# Patient Record
Sex: MALE | Race: WHITE | NOT HISPANIC OR LATINO | Employment: FULL TIME | URBAN - METROPOLITAN AREA
[De-identification: names, ages, dates, MRNs, and addresses within clinical notes are randomized per-mention and may not be internally consistent; named-entity substitution may affect disease eponyms.]

---

## 2019-12-18 ENCOUNTER — HOSPITAL ENCOUNTER (EMERGENCY)
Facility: HOSPITAL | Age: 19
Discharge: HOME | End: 2019-12-18
Attending: EMERGENCY MEDICINE | Admitting: EMERGENCY MEDICINE
Payer: COMMERCIAL

## 2019-12-18 VITALS
HEART RATE: 76 BPM | OXYGEN SATURATION: 96 % | TEMPERATURE: 98.6 F | SYSTOLIC BLOOD PRESSURE: 127 MMHG | HEIGHT: 64 IN | DIASTOLIC BLOOD PRESSURE: 65 MMHG | WEIGHT: 102 LBS | RESPIRATION RATE: 18 BRPM | BODY MASS INDEX: 17.42 KG/M2

## 2019-12-18 DIAGNOSIS — R21 RASH AND NONSPECIFIC SKIN ERUPTION: Primary | ICD-10-CM

## 2019-12-18 LAB — S PYO DNA THROAT QL NAA+PROBE: NORMAL

## 2019-12-18 PROCEDURE — 87651 STREP A DNA AMP PROBE: CPT | Performed by: PHYSICIAN ASSISTANT

## 2019-12-18 PROCEDURE — 99283 EMERGENCY DEPT VISIT LOW MDM: CPT

## 2019-12-18 PROCEDURE — 99284 EMERGENCY DEPT VISIT MOD MDM: CPT | Performed by: PHYSICIAN ASSISTANT

## 2019-12-18 RX ORDER — PREDNISONE 20 MG/1
TABLET ORAL
Qty: 18 TABLET | Refills: 0 | Status: SHIPPED | OUTPATIENT
Start: 2019-12-18 | End: 2019-12-27

## 2019-12-18 NOTE — ED PROVIDER NOTES
History  Chief Complaint   Patient presents with    Rash     Pt  stated that about 6 months ago a rash that started on his left shoulder blade  Since then it has spread to around his whole thorax- lower back and abdomen and neck  He states that today and last night it has become worse  Patient is a 19 y/o M that with h/o asthma that presents to the ED with rash to trunk and upper extremities that started 6 months ago, but is worsening  Patient states the rash is not itchy or painful  He denies new foods, detergents, medications, soaps  No sick contacts  He did have a sore throat a couple weeks ago, but that resolved  No recent fevers, chills  History provided by:  Patient  Rash   Location:  Torso and shoulder/arm  Shoulder/arm rash location:  L arm and R arm  Torso rash location:  Upper back, lower back, abd LUQ, abd LLQ, abd RLQ, abd RUQ, L chest and R chest  Quality: dryness, redness and scaling    Quality: not blistering, not draining, not itchy, not painful and not peeling    Severity:  Moderate  Onset quality:  Gradual  Duration:  6 months  Timing:  Constant  Progression:  Worsening  Chronicity:  Chronic  Context: not animal contact, not chemical exposure, not diapers, not exposure to similar rash, not food, not medications, not new detergent/soap, not nuts, not plant contact, not sick contacts and not sun exposure    Relieved by:  Nothing  Worsened by:  Nothing  Ineffective treatments:  Antibiotic cream and moisturizers  Associated symptoms: sore throat    Associated symptoms: no diarrhea, no fever, no nausea, no throat swelling, no tongue swelling and not vomiting        None       Past Medical History:   Diagnosis Date    ADHD     Asthma        History reviewed  No pertinent surgical history  History reviewed  No pertinent family history  I have reviewed and agree with the history as documented      Social History     Tobacco Use    Smoking status: Never Smoker    Smokeless tobacco: Never Used   Substance Use Topics    Alcohol use: Not on file    Drug use: Yes     Types: Marijuana        Review of Systems   Constitutional: Negative for chills and fever  HENT: Positive for sore throat  Gastrointestinal: Negative for diarrhea, nausea and vomiting  Skin: Positive for rash  Neurological: Negative for dizziness, weakness and numbness  All other systems reviewed and are negative  Physical Exam  Physical Exam   Constitutional: He is oriented to person, place, and time  He appears well-developed and well-nourished  He is cooperative  No distress  HENT:   Head: Normocephalic and atraumatic  Right Ear: Hearing and tympanic membrane normal    Left Ear: Hearing and tympanic membrane normal    Nose: Nose normal    Mouth/Throat: Uvula is midline, oropharynx is clear and moist and mucous membranes are normal    Eyes: Pupils are equal, round, and reactive to light  Conjunctivae and EOM are normal    Neck: Normal range of motion  Neck supple  Cardiovascular: Normal rate, regular rhythm and normal heart sounds  No murmur heard  Pulmonary/Chest: Effort normal and breath sounds normal  He has no wheezes  He has no rhonchi  He has no rales  Abdominal: Soft  Normal appearance and bowel sounds are normal  There is no tenderness  Musculoskeletal: Normal range of motion  He exhibits no edema  Neurological: He is alert and oriented to person, place, and time  He has normal strength  No sensory deficit  Gait normal    Skin: Skin is warm and dry  Rash (salmon colored plaques to torso and upper extremities with silvery scale  ) noted  He is not diaphoretic  No pallor  Nursing note and vitals reviewed        Vital Signs  ED Triage Vitals [12/18/19 1239]   Temperature Pulse Respirations Blood Pressure SpO2   98 6 °F (37 °C) 72 18 127/65 99 %      Temp Source Heart Rate Source Patient Position - Orthostatic VS BP Location FiO2 (%)   Tympanic Monitor Lying Left arm --      Pain Score       No Pain           Vitals:    12/18/19 1239 12/18/19 1245   BP: 127/65 127/65   Pulse: 72 76   Patient Position - Orthostatic VS: Lying          Visual Acuity      ED Medications  Medications - No data to display    Diagnostic Studies  Results Reviewed     Procedure Component Value Units Date/Time    Strep A PCR [[id number]]  (Normal) Collected:  12/18/19 1252    Lab Status:  Final result Specimen:  Throat Updated:  12/18/19 1355     STREP A PCR None Detected                 No orders to display              Procedures  Procedures         ED Course                               MDM  Number of Diagnoses or Management Options  Rash and nonspecific skin eruption: established and worsening  Diagnosis management comments: Patient with salmon sandpaper rash, will order rapid strep to r/o strep pharyngitis  Differential includes guttate psoriasis, pityriasis rosea without a herald patch  Will give course of prednisone and advised f/u with derm if no improvement  Amount and/or Complexity of Data Reviewed  Clinical lab tests: ordered and reviewed    Patient Progress  Patient progress: stable        Disposition  Final diagnoses:   Rash and nonspecific skin eruption     Time reflects when diagnosis was documented in both MDM as applicable and the Disposition within this note     Time User Action Codes Description Comment    12/18/2019  2:02 PM Mariajose Nunes Add [R21] Rash and nonspecific skin eruption       ED Disposition     ED Disposition Condition Date/Time Comment    Discharge Stable Wed Dec 18, 2019  2:02 PM Ramos Thomson discharge to home/self care              Follow-up Information     Follow up With Specialties Details Why Contact Info Additional [address] D[facility]at[facility] Schedule an appointment as soon as possible for a visit  For recheck [address] [address] [place], [address]   [phone]          Patient's Medications   Discharge Prescriptions    PREDNISONE 20 MG TABLET    Take 3 tablets (60 mg total) by mouth daily for 3 days, THEN 2 tablets (40 mg total) daily for 3 days, THEN 1 tablet (20 mg total) daily for 3 days  Start Date: 12/18/2019End Date: 12/27/2019       Order Dose: --       Quantity: 18 tablet    Refills: 0     No discharge procedures on file      ED Provider  Electronically Signed by           Juan José Ma PA-C  12/18/19 0319

## 2019-12-18 NOTE — DISCHARGE INSTRUCTIONS
Take prednisone as directed  Use a moisturizer like lubriderm or gold bond, no other ointments  Follow up with dermatologist if rash continues

## 2020-09-02 ENCOUNTER — HOSPITAL ENCOUNTER (EMERGENCY)
Facility: HOSPITAL | Age: 20
Discharge: HOME | End: 2020-09-02
Attending: EMERGENCY MEDICINE | Admitting: EMERGENCY MEDICINE
Payer: COMMERCIAL

## 2020-09-02 VITALS
TEMPERATURE: 98.8 F | HEART RATE: 76 BPM | RESPIRATION RATE: 18 BRPM | DIASTOLIC BLOOD PRESSURE: 77 MMHG | WEIGHT: 96 LBS | OXYGEN SATURATION: 100 % | BODY MASS INDEX: 16.48 KG/M2 | SYSTOLIC BLOOD PRESSURE: 120 MMHG

## 2020-09-02 DIAGNOSIS — T23.102A: Primary | ICD-10-CM

## 2020-09-02 PROCEDURE — 99283 EMERGENCY DEPT VISIT LOW MDM: CPT

## 2020-09-02 PROCEDURE — 96372 THER/PROPH/DIAG INJ SC/IM: CPT

## 2020-09-02 PROCEDURE — 99284 EMERGENCY DEPT VISIT MOD MDM: CPT | Performed by: EMERGENCY MEDICINE

## 2020-09-02 RX ORDER — KETOROLAC TROMETHAMINE 30 MG/ML
15 INJECTION, SOLUTION INTRAMUSCULAR; INTRAVENOUS ONCE
Status: COMPLETED | OUTPATIENT
Start: 2020-09-02 | End: 2020-09-02

## 2020-09-02 RX ADMIN — KETOROLAC TROMETHAMINE 15 MG: 30 INJECTION, SOLUTION INTRAMUSCULAR at 21:50
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
## 2020-09-03 NOTE — ED NOTES
Xeroform dressing applied to left first and second fingers as per instructed by Dr Nuvia Lee, CARLY  09/02/20 4042
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
## 2020-09-08 ENCOUNTER — VBI (OUTPATIENT)
Dept: FAMILY MEDICINE CLINIC | Facility: HOSPITAL | Age: 20
End: 2020-09-08
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
## 2024-08-20 ENCOUNTER — HOSPITAL ENCOUNTER (EMERGENCY)
Facility: HOSPITAL | Age: 24
Discharge: HOME | End: 2024-08-20
Attending: EMERGENCY MEDICINE

## 2024-08-20 ENCOUNTER — APPOINTMENT (EMERGENCY)
Dept: RADIOLOGY | Facility: HOSPITAL | Age: 24
End: 2024-08-20

## 2024-08-20 VITALS
OXYGEN SATURATION: 100 % | HEIGHT: 64 IN | SYSTOLIC BLOOD PRESSURE: 129 MMHG | WEIGHT: 120 LBS | DIASTOLIC BLOOD PRESSURE: 74 MMHG | HEART RATE: 51 BPM | TEMPERATURE: 98 F | BODY MASS INDEX: 20.49 KG/M2 | RESPIRATION RATE: 16 BRPM

## 2024-08-20 DIAGNOSIS — T07.XXXA ABRASIONS OF MULTIPLE SITES: ICD-10-CM
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
## 2024-08-20 DIAGNOSIS — S42.032A DISPLACED FRACTURE OF LATERAL END OF LEFT CLAVICLE, INITIAL ENCOUNTER FOR CLOSED FRACTURE: Primary | ICD-10-CM

## 2024-08-20 PROCEDURE — 73030 X-RAY EXAM OF SHOULDER: CPT

## 2024-08-20 PROCEDURE — 99284 EMERGENCY DEPT VISIT MOD MDM: CPT | Performed by: EMERGENCY MEDICINE

## 2024-08-20 PROCEDURE — 99284 EMERGENCY DEPT VISIT MOD MDM: CPT

## 2024-08-20 PROCEDURE — 71046 X-RAY EXAM CHEST 2 VIEWS: CPT

## 2024-08-20 RX ORDER — IBUPROFEN 600 MG/1
600 TABLET, FILM COATED ORAL EVERY 6 HOURS PRN
Qty: 30 TABLET | Refills: 0 | Status: SHIPPED | OUTPATIENT
Start: 2024-08-20

## 2024-08-20 RX ORDER — GINSENG 100 MG
1 CAPSULE ORAL 2 TIMES DAILY
Qty: 28 G | Refills: 0 | Status: SHIPPED | OUTPATIENT
Start: 2024-08-20

## 2024-08-20 RX ORDER — OXYCODONE AND ACETAMINOPHEN 5; 325 MG/1; MG/1
1 TABLET ORAL ONCE
Status: COMPLETED | OUTPATIENT
Start: 2024-08-20 | End: 2024-08-20

## 2024-08-20 RX ORDER — ACETAMINOPHEN 325 MG/1
650 TABLET ORAL ONCE
Status: COMPLETED | OUTPATIENT
Start: 2024-08-20 | End: 2024-08-20

## 2024-08-20 RX ORDER — OXYCODONE AND ACETAMINOPHEN 5; 325 MG/1; MG/1
1 TABLET ORAL EVERY 4 HOURS PRN
Qty: 10 TABLET | Refills: 0 | Status: SHIPPED | OUTPATIENT
Start: 2024-08-20 | End: 2024-08-30

## 2024-08-20 RX ORDER — IBUPROFEN 600 MG/1
600 TABLET, FILM COATED ORAL ONCE
Status: COMPLETED | OUTPATIENT
Start: 2024-08-20 | End: 2024-08-20

## 2024-08-20 RX ORDER — GINSENG 100 MG
1 CAPSULE ORAL ONCE
Status: COMPLETED | OUTPATIENT
Start: 2024-08-20 | End: 2024-08-20

## 2024-08-20 RX ADMIN — IBUPROFEN 600 MG: 600 TABLET, FILM COATED ORAL at 18:49

## 2024-08-20 RX ADMIN — ACETAMINOPHEN 650 MG: 325 TABLET ORAL at 18:49

## 2024-08-20 RX ADMIN — OXYCODONE HYDROCHLORIDE AND ACETAMINOPHEN 1 TABLET: 5; 325 TABLET ORAL at 18:49

## 2024-08-20 RX ADMIN — BACITRACIN ZINC 1 SMALL APPLICATION: 500 OINTMENT TOPICAL at 19:09
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
## 2024-08-21 ENCOUNTER — TELEPHONE (OUTPATIENT)
Age: 24
End: 2024-08-21

## 2024-08-21 NOTE — TELEPHONE ENCOUNTER
Hello,    Please advise if a forced appointment can be accommodated for the patient:    Call back #: [phone] or [phone] Patients mother Marivel    Insurance: self pay    Reason for appointment: Displaced fracture of lateral end of left clavicle    Requested doctor and/or location: [facility]      Thank you.

## 2024-08-22 ENCOUNTER — APPOINTMENT (OUTPATIENT)
Dept: RADIOLOGY | Facility: CLINIC | Age: 24
End: 2024-08-22

## 2024-08-22 VITALS
SYSTOLIC BLOOD PRESSURE: 120 MMHG | WEIGHT: 94 LBS | HEIGHT: 64 IN | BODY MASS INDEX: 16.05 KG/M2 | DIASTOLIC BLOOD PRESSURE: 68 MMHG

## 2024-08-22 DIAGNOSIS — S42.022A CLOSED DISPLACED FRACTURE OF SHAFT OF LEFT CLAVICLE, INITIAL ENCOUNTER: Primary | ICD-10-CM

## 2024-08-22 DIAGNOSIS — S42.032A DISPLACED FRACTURE OF LATERAL END OF LEFT CLAVICLE, INITIAL ENCOUNTER FOR CLOSED FRACTURE: ICD-10-CM

## 2024-08-22 PROCEDURE — 99203 OFFICE O/P NEW LOW 30 MIN: CPT | Performed by: ORTHOPAEDIC SURGERY

## 2024-08-22 PROCEDURE — 23500 CLTX CLAVICULAR FX W/O MNPJ: CPT | Performed by: ORTHOPAEDIC SURGERY

## 2024-08-22 PROCEDURE — 73000 X-RAY EXAM OF COLLAR BONE: CPT

## 2024-08-22 NOTE — ASSESSMENT & PLAN NOTE
Findings are consistent with a mid-shaft clavicle fracture. Findings, treatment options and images were reviewed and discussed with the patient. He was provided with a figure 8 clavicle strap at today's visit. He will remain the the figure 8 clavicle strap for 6-8 weeks. We got x-rays at today's visit to evaluate the position of the clavicle once the figure-of-eight strap was placed, which was in good alignment. He will need to wear the Figure 8 clavicle strap all day except for showering. Follow up in 3 weeks with repeat x-rays.  All patient's questions were answered to his satisfaction.  This note is created using dictation transcription.  It may contain typographical errors, grammatical errors, improperly dictated words, background noise and other errors.
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
## 2024-09-12 ENCOUNTER — APPOINTMENT (OUTPATIENT)
Dept: RADIOLOGY | Facility: CLINIC | Age: 24
End: 2024-09-12

## 2024-09-12 ENCOUNTER — OFFICE VISIT (OUTPATIENT)
Dept: OBGYN CLINIC | Facility: CLINIC | Age: 24
End: 2024-09-12

## 2024-09-12 ENCOUNTER — TELEPHONE (OUTPATIENT)
Dept: OBGYN CLINIC | Facility: CLINIC | Age: 24
End: 2024-09-12

## 2024-09-12 VITALS
SYSTOLIC BLOOD PRESSURE: 110 MMHG | WEIGHT: 95 LBS | DIASTOLIC BLOOD PRESSURE: 66 MMHG | HEIGHT: 64 IN | BODY MASS INDEX: 16.22 KG/M2

## 2024-09-12 DIAGNOSIS — S42.022D CLOSED DISPLACED FRACTURE OF SHAFT OF LEFT CLAVICLE WITH ROUTINE HEALING, SUBSEQUENT ENCOUNTER: Primary | ICD-10-CM

## 2024-09-12 DIAGNOSIS — S42.022D CLOSED DISPLACED FRACTURE OF SHAFT OF LEFT CLAVICLE WITH ROUTINE HEALING, SUBSEQUENT ENCOUNTER: ICD-10-CM

## 2024-09-12 PROCEDURE — 73000 X-RAY EXAM OF COLLAR BONE: CPT

## 2024-09-12 PROCEDURE — 99024 POSTOP FOLLOW-UP VISIT: CPT | Performed by: ORTHOPAEDIC SURGERY

## 2024-09-12 NOTE — ASSESSMENT & PLAN NOTE
Findings are consistent with a left mid-shaft clavicle fracture, DOI 8/20/24.  Findings and treatment options were discussed with the patient.  X-rays were reviewed with him that revealed a stable fracture with no further displacement.  Continue use of figure-of-eight strap.  No lifting, pushing or pulling with that arm.  Follow up in 3 weeks with repeat x-rays of the left clavicle.  All patient's questions were answered to his satisfaction.  This note is created using dictation transcription.  It may contain typographical errors, grammatical errors, improperly dictated words, background noise and other errors.
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
